# Patient Record
Sex: FEMALE | Race: BLACK OR AFRICAN AMERICAN | NOT HISPANIC OR LATINO
[De-identification: names, ages, dates, MRNs, and addresses within clinical notes are randomized per-mention and may not be internally consistent; named-entity substitution may affect disease eponyms.]

---

## 2017-09-10 ENCOUNTER — RECORDS - HEALTHEAST (OUTPATIENT)
Dept: ADMINISTRATIVE | Facility: OTHER | Age: 52
End: 2017-09-10

## 2017-09-12 ENCOUNTER — RECORDS - HEALTHEAST (OUTPATIENT)
Dept: ADMINISTRATIVE | Facility: OTHER | Age: 52
End: 2017-09-12

## 2017-09-25 ENCOUNTER — RECORDS - HEALTHEAST (OUTPATIENT)
Dept: ADMINISTRATIVE | Facility: OTHER | Age: 52
End: 2017-09-25

## 2017-10-17 ENCOUNTER — RECORDS - HEALTHEAST (OUTPATIENT)
Dept: ADMINISTRATIVE | Facility: OTHER | Age: 52
End: 2017-10-17

## 2017-11-18 ENCOUNTER — RECORDS - HEALTHEAST (OUTPATIENT)
Dept: ADMINISTRATIVE | Facility: OTHER | Age: 52
End: 2017-11-18

## 2017-11-19 ENCOUNTER — RECORDS - HEALTHEAST (OUTPATIENT)
Dept: ADMINISTRATIVE | Facility: OTHER | Age: 52
End: 2017-11-19

## 2017-11-21 ENCOUNTER — RECORDS - HEALTHEAST (OUTPATIENT)
Dept: ADMINISTRATIVE | Facility: OTHER | Age: 52
End: 2017-11-21

## 2017-11-26 ENCOUNTER — HEALTH MAINTENANCE LETTER (OUTPATIENT)
Age: 52
End: 2017-11-26

## 2017-12-19 ENCOUNTER — RECORDS - HEALTHEAST (OUTPATIENT)
Dept: ADMINISTRATIVE | Facility: OTHER | Age: 52
End: 2017-12-19

## 2018-05-08 ENCOUNTER — OFFICE VISIT - HEALTHEAST (OUTPATIENT)
Dept: INTERNAL MEDICINE | Facility: CLINIC | Age: 53
End: 2018-05-08

## 2018-05-08 DIAGNOSIS — I10 ESSENTIAL HYPERTENSION: ICD-10-CM

## 2018-05-08 DIAGNOSIS — G89.29 CHRONIC PAIN OF RIGHT ANKLE: ICD-10-CM

## 2018-05-08 DIAGNOSIS — R51.9 FREQUENT HEADACHES: ICD-10-CM

## 2018-05-08 DIAGNOSIS — M25.571 CHRONIC PAIN OF RIGHT ANKLE: ICD-10-CM

## 2018-05-08 DIAGNOSIS — I67.1 NONRUPTURED CEREBRAL ANEURYSM: ICD-10-CM

## 2018-05-08 DIAGNOSIS — Z12.31 VISIT FOR SCREENING MAMMOGRAM: ICD-10-CM

## 2018-05-08 ASSESSMENT — MIFFLIN-ST. JEOR: SCORE: 1211.38

## 2018-05-15 ENCOUNTER — COMMUNICATION - HEALTHEAST (OUTPATIENT)
Dept: NEUROSURGERY | Facility: CLINIC | Age: 53
End: 2018-05-15

## 2021-06-01 VITALS — BODY MASS INDEX: 27.51 KG/M2 | WEIGHT: 149.5 LBS | HEIGHT: 62 IN

## 2021-06-17 NOTE — PROGRESS NOTES
1. Visit for screening mammogram  Mammo Screening Bilateral   2. Essential hypertension  nitroglycerin (NITROSTAT) 0.4 MG SL tablet   3. Frequent headaches     4. Nonruptured cerebral aneurysm  clopidogrel (PLAVIX) 75 mg tablet    Ambulatory referral to Neurosurgery   5. Chronic pain of right ankle  Ambulatory referral to Podiatry      Plan: I suggested that we set her up with 1 of the other doctors here for further ongoing continuity care.  We will have her set up that appointment with the specialty  for about 4-6 weeks out to review the other things we have set up for her.  We will set her up with neurosurgery, to follow this nonruptured cerebral aneurysm that was found at the [facility].  For her chronic right ankle pain, related to evidently a fracture about a year ago, will set her up for podiatry.  The patient wanted to have a boot today, but I think that to be more prudent for her to be seen and evaluated by podiatry to see if there is other things we can do to strengthen the area or make her better rather than just putting her in a boot.  I did refill her medications for, but made it clear that I would have to have her follow-up with 1 of the permanent doctors here for ongoing continuity care and refills down the road for that.  She should probably have a complete physical done as well and I suggested that that she make that part of her planning for in the next several months.    Subjective: 53-year-old patient who is new to us here at this clinic today.  She was living down in [place], and being seen at the [facility].  She is moved up here recently is at the [facility].  She needs to establish care, and get started with our clinic, and she needs a few refills today.  We reviewed her past medical history, which is quite long and complex.  Things of note include a nonruptured cerebral aneurysm that evidently the [facility] found a few years back.  The patient is not a terribly helpful  "historian, but it sounds like they found a couple, and somewhere along her medical journey, that one was \"taken care of\", but there is one more that is present and stable that male did not feel the need to do anything about at the time it was looked at.  I was able to take up some information that she brought with her that they wanted to just do another follow-up MRI in May 2019, but again she has not really been seen by neurosurgery for a couple of years on this.  It does give her headaches occasionally released she is blaming headaches on that.  She has not had any visual symptoms, or numbness or tingling in her extremities from that.    She also has hypertension, and is on several medications for that, which seem to be helping fairly well.  She states that occasionally she can feel that her heart blood pressure is up and takes a couple of extra medications when that happens.  In general she has been trying to manage it on her own, but needs to follow-up here on a regular basis to make sure that she is controlling it well enough.    Evidently she broke her right foot a year ago she was not too terribly detail oriented about that either, but she has had some ongoing issues with that foot.  She states it will swell up on her if she walks a lot.  Currently it is not swollen at all.  It does give her pain almost every day, but she is able to manage it with over-the-counter medications for the most part.  She is wondering if a boot would be helpful for her.    Patient is new patient to the clinic. Please see past medical history, surgical history, social history and family history, all of which were completed in their entirety today.     ROS: Other than what is mentioned above, her conference of review systems done today is negative.    Objective: Well-appearing female in no acute distress.  Vital signs as noted.  Ears are clear bilaterally.  Eyes and nose are normal oropharynx pink and moist without erythema or " exudate.  Neck is without lymphadenopathy.  Chest clear to auscultation.  Heart regular rate and rhythm.  Abdomen is benign.  Extremities no swelling is noted in either lower extremity today.  Good distal pulses appreciated.